# Patient Record
Sex: MALE | Race: BLACK OR AFRICAN AMERICAN | NOT HISPANIC OR LATINO | Employment: FULL TIME | URBAN - METROPOLITAN AREA
[De-identification: names, ages, dates, MRNs, and addresses within clinical notes are randomized per-mention and may not be internally consistent; named-entity substitution may affect disease eponyms.]

---

## 2017-12-19 ENCOUNTER — HOSPITAL ENCOUNTER (EMERGENCY)
Facility: OTHER | Age: 27
Discharge: HOME | End: 2017-12-19
Attending: EMERGENCY MEDICINE
Payer: COMMERCIAL

## 2017-12-19 VITALS
TEMPERATURE: 98 F | SYSTOLIC BLOOD PRESSURE: 171 MMHG | HEART RATE: 98 BPM | OXYGEN SATURATION: 96 % | WEIGHT: 225 LBS | DIASTOLIC BLOOD PRESSURE: 100 MMHG | BODY MASS INDEX: 33.33 KG/M2 | RESPIRATION RATE: 18 BRPM | HEIGHT: 69 IN

## 2017-12-19 DIAGNOSIS — J45.901 EXACERBATION OF ASTHMA, UNSPECIFIED ASTHMA SEVERITY, UNSPECIFIED WHETHER PERSISTENT: Primary | ICD-10-CM

## 2017-12-19 DIAGNOSIS — J06.9 UPPER RESPIRATORY INFECTION, VIRAL: ICD-10-CM

## 2017-12-19 PROCEDURE — 25000242 PHARM REV CODE 250 ALT 637 W/ HCPCS: Performed by: EMERGENCY MEDICINE

## 2017-12-19 PROCEDURE — 94761 N-INVAS EAR/PLS OXIMETRY MLT: CPT

## 2017-12-19 PROCEDURE — 94640 AIRWAY INHALATION TREATMENT: CPT

## 2017-12-19 PROCEDURE — 99283 EMERGENCY DEPT VISIT LOW MDM: CPT | Mod: 25

## 2017-12-19 RX ORDER — PREDNISONE 20 MG/1
20 TABLET ORAL DAILY
Qty: 5 TABLET | Refills: 0 | Status: SHIPPED | OUTPATIENT
Start: 2017-12-19 | End: 2017-12-24

## 2017-12-19 RX ORDER — IPRATROPIUM BROMIDE AND ALBUTEROL SULFATE 2.5; .5 MG/3ML; MG/3ML
3 SOLUTION RESPIRATORY (INHALATION)
Status: COMPLETED | OUTPATIENT
Start: 2017-12-19 | End: 2017-12-19

## 2017-12-19 RX ORDER — ALBUTEROL SULFATE 90 UG/1
1-2 AEROSOL, METERED RESPIRATORY (INHALATION) EVERY 6 HOURS PRN
Qty: 1 INHALER | Refills: 0 | Status: SHIPPED | OUTPATIENT
Start: 2017-12-19 | End: 2018-12-19

## 2017-12-19 RX ADMIN — IPRATROPIUM BROMIDE AND ALBUTEROL SULFATE 3 ML: .5; 3 SOLUTION RESPIRATORY (INHALATION) at 09:12

## 2017-12-19 NOTE — ED NOTES
Pt has hx asthma.  Reports flare up x couple of days with cold symptoms. Runny nose with productive cough with yellow sputum. Denies fever. NAD. Will cont to monitor.

## 2017-12-19 NOTE — ED PROVIDER NOTES
"Encounter Date: 12/19/2017    SCRIBE #1 NOTE: I, Isabela Yoon, am scribing for, and in the presence of, Dr. Snowden.       History     Chief Complaint   Patient presents with    Asthma     "Started off as a cold and triggered my asthma. I don't have an inhaler and feel like I can't breath"     Time seen by provider: 8:53 AM    This is a 27 y.o. male who presents with complaint of an asthma exacerbation that has persisted for the past few days.  The patient reports that this episode began after having some cold-like symptoms.  He endorses congestion, rhinorrhea, sore throat, cough, wheezing, and SOB.  He denies fever, chills, appetite changes, chest tightness, CP, abdominal pain, and N/V/D.  He notes that his symptoms appear to be worse at night.  Although the patient has attempted to alleviate his discomfort with Dayquil, he has found no relief.  He reports no other identifying, alleviating, or exacerbating factors.  While he has had similar episodes in the past, he notes that they only occur when he has cold-like symptoms.  He mentions that he ran out of his albuterol inhaler "a while ago," as he does not have asthma exacerbations regularly.  The patient admits that he smokes cigarettes, but "not recently."        The history is provided by the patient.     Review of patient's allergies indicates:   Allergen Reactions    Shellfish containing products Hives     Past Medical History:   Diagnosis Date    Asthma      No past surgical history on file.  No family history on file.  Social History   Substance Use Topics    Smoking status: Never Smoker    Smokeless tobacco: Never Used    Alcohol use Yes     Review of Systems   Constitutional: Negative for appetite change, chills and fever.   HENT: Positive for congestion and rhinorrhea. Negative for facial swelling.    Respiratory: Positive for cough, shortness of breath and wheezing. Negative for chest tightness.    Cardiovascular: Negative for chest pain. "   Gastrointestinal: Negative for abdominal pain, diarrhea, nausea and vomiting.   Endocrine: Negative for polyuria.   Genitourinary: Negative for dysuria.   Musculoskeletal: Negative for myalgias.   Skin: Negative for rash.   Neurological: Negative for headaches.       Physical Exam     Initial Vitals [12/19/17 0815]   BP Pulse Resp Temp SpO2   139/87 85 16 98.3 °F (36.8 °C) 96 %      MAP       104.33         Physical Exam    Nursing note and vitals reviewed.  Constitutional: He appears well-developed and well-nourished. He is not diaphoretic. No distress.   HENT:   Head: Normocephalic and atraumatic.   Right Ear: External ear normal.   Left Ear: External ear normal.   Nose: Rhinorrhea present.   Mouth/Throat: Posterior oropharyngeal erythema present.   Mild erythema of the posterior oropharynx.  Clear rhinorrhea.     Eyes: EOM are normal. Right eye exhibits no discharge. Left eye exhibits no discharge.   Neck: Normal range of motion.   Cardiovascular: Normal rate, regular rhythm and normal heart sounds. Exam reveals no gallop and no friction rub.    No murmur heard.  Pulmonary/Chest: No respiratory distress. He has wheezes. He has no rhonchi. He has no rales.   Good air exchange.  Trace expiratory wheezing.  Speaks in full sentences.   Abdominal: Soft. There is no tenderness. There is no rebound and no guarding.   Musculoskeletal: Normal range of motion. He exhibits no edema or tenderness.   Neurological: He is alert and oriented to person, place, and time.   Skin: Skin is warm and dry. No rash and no abscess noted. No erythema. No pallor.   Psychiatric: He has a normal mood and affect. His behavior is normal. Judgment and thought content normal.         ED Course   Procedures  Labs Reviewed - No data to display   Imaging Results    None                 Medical Decision Making:   ED Management:  9:05 AM. I have counseled the patient on the importance of smoking cessation.             Scribe Attestation:   Malissaibhudson  #1: I performed the above scribed service and the documentation accurately describes the services I performed. I attest to the accuracy of the note.    Attending Attestation:           Physician Attestation for Scribe:  Physician Attestation Statement for Scribe #1: I, Dr. Snowden, reviewed documentation, as scribed by Isabela Yoon in my presence, and it is both accurate and complete.                 ED Course      Patient presents with recent runny nose nasal congestion and exacerbation of his asthma.  He reports occasional asthma use usually in setting of similar illness.  Afebrile well-appearing.  Suspect viral URI with exacerbation of his mild asthma given nebulizer treatment here and feels back to baseline.  Does not currently have an inhaler prescription provided along with burst of prednisone.  Continued over-the-counter cough/cold medicine.  Stable for discharge    Clinical Impression:     1. Exacerbation of asthma, unspecified asthma severity, unspecified whether persistent    2. Upper respiratory infection, viral                                 Sascha Snowden II, MD  12/19/17 [id number]